# Patient Record
(demographics unavailable — no encounter records)

---

## 2025-04-02 NOTE — HISTORY OF PRESENT ILLNESS
[FreeTextEntry1] : Patient is a 61yo female presenting to the clinic for heart "skipping a beat", risk assessmentm and establishing care. Says her heart is skipping a beat. First episode on 3/17. Same day as  of her best friends mother. Happened for a weeklong. Would occur when she sits down in the evening watching TV, noticed it more when she is relaxed. Said he heart skips a beat every 22 seconds, episodes last until she goes to sleep. No noticeable triggers. Thought it could be related to BP so took the Olmesartan, but it didn't work. Nothing makes it better or worse. No associated pain. No recent dizziness or fainting. In 20s, had similar episodes and saw a cardiologist-wore Holter monitor but nothing came of it. Started new drug Phentermine a year ago, never bothered her, started taking 2 pills a day 3/8 and then back to one pill after 2 weeks. Been taking sertraline since  with no problems. Brazilian bum bum cream with caffeine use on her legs. 1-2 cups of tea everyday for whole life. Went to urgent care, did EKG, looked fine but rec'ed to go see a cardiologist to get checked. Not currently experiencing any episodes.   PMH- high blood pressure, kidney stone  PSH- two C-sections and tonsilectomy  Meds- sertraline 100mg QD, phen 15mg QD, omeprazole QD Allergies- hydrocortisone cream (anaphylaxis) FH- adopted, mom had heart problems and had a lot of stents placed  SH- smoking (once a week on - about a pack a week), alcohol (bottle of wine a week), illicit drugs (occasional weed smoking), sleep (pees every 2 hours, takes Zquil or melatonin gummies to fall asleep), exercise (goes to the gym 4 days a week- treadmill and strength training), diet (well balanced, high protein)

## 2025-04-02 NOTE — DISCUSSION/SUMMARY
[EKG obtained to assist in diagnosis and management of assessed problem(s)] : EKG obtained to assist in diagnosis and management of assessed problem(s) [FreeTextEntry1] : Discussed at length with the patient that this is most likely due to premature beats in the heart.  Discussed the potential triggers of premature beats like caffeine, nicotine, stress, and any stimulants.  Continue BP medication.  Plan to follow up if symptoms recur or persist.   Addendum - Patient seen and examined with [facility]. Agree with above. Symptoms typical of premature beats. Discussed potential triggers of ectopy. Reassurance. Advised take antihypertensive med regularly.  Recommended follow up if palps recur and would do monitor.